# Patient Record
Sex: FEMALE | Race: OTHER | NOT HISPANIC OR LATINO | URBAN - METROPOLITAN AREA
[De-identification: names, ages, dates, MRNs, and addresses within clinical notes are randomized per-mention and may not be internally consistent; named-entity substitution may affect disease eponyms.]

---

## 2022-07-19 ENCOUNTER — OUTPATIENT (OUTPATIENT)
Dept: OUTPATIENT SERVICES | Facility: HOSPITAL | Age: 42
LOS: 1 days | End: 2022-07-19
Payer: COMMERCIAL

## 2022-07-19 DIAGNOSIS — R06.02 SHORTNESS OF BREATH: ICD-10-CM

## 2022-07-19 PROCEDURE — 94729 DIFFUSING CAPACITY: CPT

## 2022-07-19 PROCEDURE — 94010 BREATHING CAPACITY TEST: CPT | Mod: 26

## 2022-07-19 PROCEDURE — 94760 N-INVAS EAR/PLS OXIMETRY 1: CPT

## 2022-07-19 PROCEDURE — 94726 PLETHYSMOGRAPHY LUNG VOLUMES: CPT

## 2022-07-19 PROCEDURE — 94729 DIFFUSING CAPACITY: CPT | Mod: 26

## 2022-07-19 PROCEDURE — 94060 EVALUATION OF WHEEZING: CPT

## 2022-07-19 PROCEDURE — 94726 PLETHYSMOGRAPHY LUNG VOLUMES: CPT | Mod: 26

## 2022-07-21 ENCOUNTER — OUTPATIENT (OUTPATIENT)
Dept: OUTPATIENT SERVICES | Facility: HOSPITAL | Age: 42
LOS: 1 days | End: 2022-07-21
Payer: COMMERCIAL

## 2022-07-21 DIAGNOSIS — R06.02 SHORTNESS OF BREATH: ICD-10-CM

## 2022-07-21 PROCEDURE — 94070 EVALUATION OF WHEEZING: CPT | Mod: 26

## 2022-07-21 PROCEDURE — 94618 PULMONARY STRESS TESTING: CPT

## 2022-07-21 PROCEDURE — 94618 PULMONARY STRESS TESTING: CPT | Mod: 26,59

## 2022-07-21 PROCEDURE — 94070 EVALUATION OF WHEEZING: CPT

## 2022-07-21 RX ORDER — ALBUTEROL 90 UG/1
2.5 AEROSOL, METERED ORAL ONCE
Refills: 0 | Status: DISCONTINUED | OUTPATIENT
Start: 2022-07-21 | End: 2022-08-04

## 2022-08-19 ENCOUNTER — APPOINTMENT (OUTPATIENT)
Dept: PULMONOLOGY | Facility: CLINIC | Age: 42
End: 2022-08-19

## 2022-08-19 PROBLEM — Z00.00 ENCOUNTER FOR PREVENTIVE HEALTH EXAMINATION: Status: ACTIVE | Noted: 2022-08-19

## 2022-11-02 ENCOUNTER — NON-APPOINTMENT (OUTPATIENT)
Age: 42
End: 2022-11-02

## 2022-11-04 ENCOUNTER — NON-APPOINTMENT (OUTPATIENT)
Age: 42
End: 2022-11-04

## 2022-11-22 ENCOUNTER — LABORATORY RESULT (OUTPATIENT)
Age: 42
End: 2022-11-22

## 2022-11-22 ENCOUNTER — APPOINTMENT (OUTPATIENT)
Dept: INTERNAL MEDICINE | Facility: CLINIC | Age: 42
End: 2022-11-22
Payer: COMMERCIAL

## 2022-11-22 ENCOUNTER — NON-APPOINTMENT (OUTPATIENT)
Age: 42
End: 2022-11-22

## 2022-11-22 VITALS
SYSTOLIC BLOOD PRESSURE: 127 MMHG | BODY MASS INDEX: 34.57 KG/M2 | TEMPERATURE: 97.8 F | RESPIRATION RATE: 12 BRPM | HEART RATE: 66 BPM | OXYGEN SATURATION: 100 % | DIASTOLIC BLOOD PRESSURE: 84 MMHG | WEIGHT: 200 LBS | HEIGHT: 63.78 IN

## 2022-11-22 DIAGNOSIS — Z12.39 ENCOUNTER FOR OTHER SCREENING FOR MALIGNANT NEOPLASM OF BREAST: ICD-10-CM

## 2022-11-22 DIAGNOSIS — Z83.438 FAMILY HISTORY OF OTHER DISORDER OF LIPOPROTEIN METABOLISM AND OTHER LIPIDEMIA: ICD-10-CM

## 2022-11-22 DIAGNOSIS — Z82.49 FAMILY HISTORY OF ISCHEMIC HEART DISEASE AND OTHER DISEASES OF THE CIRCULATORY SYSTEM: ICD-10-CM

## 2022-11-22 PROCEDURE — 99386 PREV VISIT NEW AGE 40-64: CPT | Mod: 25

## 2022-11-22 PROCEDURE — 99202 OFFICE O/P NEW SF 15 MIN: CPT | Mod: GC,25

## 2022-11-22 PROCEDURE — 83036 HEMOGLOBIN GLYCOSYLATED A1C: CPT | Mod: QW

## 2022-11-22 RX ORDER — SEMAGLUTIDE 1.34 MG/ML
2 INJECTION, SOLUTION SUBCUTANEOUS
Qty: 1.5 | Refills: 0 | Status: ACTIVE | COMMUNITY
Start: 2022-11-22 | End: 1900-01-01

## 2022-11-22 NOTE — ASSESSMENT
[FreeTextEntry1] : 43yo F w/ a PMHx of obesity and COVID presenting to establish care. \par \par #Obesity\par Patient reports she was in discussion with her previous PCP about starting weight loss medications. She tried metformin but did not like the way it made her feel. She states she was going to start ozempic but had to switch PCPs due to insurance issues before she could start the medication. Patient reports she has made changes to her diet and increased her physical activity with minimal change in weight. \par - f/u A1c and CMP\par - start ozempic (0.25mg once weekly for weeks 1-4 and escalate accordingly)\par - patient to send labs from previous PCP from July 2022\par \par #HCM\par - f/u labs from previous PCP re A1c and lipid panel (patient reports labs were within the past 6 months)\par - reports most recent pap 2 years ago\par - s/p flu shot this year \par \par \par *RTC in 1 month to assess response to ozempic. If tolerating, can increase to 0.5mg once weekly for weeks 5-8 and escalate accordingly)

## 2022-11-22 NOTE — END OF VISIT
[] : Resident [FreeTextEntry3] : 42F presenting to establish care. 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 Is interested in starting ozempic for weight loss  which she had discussed with her prior PCP. No family or personal h/o thyroid tumors, no h/o pancreatitis. Tried phentermine in the past with good effect, but did not continue on it due to concern for possible CV side effects w/ prolonged use. Tried metformin which caused some stomach upset, used it only sporadically for a few months. Not currently sexually active. Well appearing on exam. After discussion of risks and benefits of GLP-1 agonists, prescribed ozempic. Discussed possible barriers of insurance coverage and recent shortage of this medication. Will check A1C and CMP today (TSH, lipids checked by prior PCP in July 2022). Pt will notify us if she has difficulty obtaining ozempic. RTC 3-4 weeks for f/u above or earlier prn.

## 2022-11-22 NOTE — HISTORY OF PRESENT ILLNESS
[FreeTextEntry1] : establish care [de-identified] : 41yo F w/ a PMHx of obesity and COVID presenting to establish care. Patient reports she feels well and has no acute complaints. She tested positive for COVID one month prior to visit and reports she feels back to baseline. Patient reports she was in discussion with her previous PCP about starting weight loss medications. She tried metformin but did not like the way it made her feel. She states she was going to start ozempic but had to switch PCPs due to insurance issues before she could start the medication. Patient reports she has made changes to her diet and increased her physical activity with minimal change in weight. Denies fevers, headaches, dizziness, lightheadedness, chest pain, sob, abdominal pain, n/v/d.

## 2022-12-15 ENCOUNTER — NON-APPOINTMENT (OUTPATIENT)
Age: 42
End: 2022-12-15

## 2022-12-22 ENCOUNTER — EMERGENCY (EMERGENCY)
Facility: HOSPITAL | Age: 42
LOS: 1 days | Discharge: HOME | End: 2022-12-22
Admitting: STUDENT IN AN ORGANIZED HEALTH CARE EDUCATION/TRAINING PROGRAM
Payer: COMMERCIAL

## 2022-12-22 VITALS
TEMPERATURE: 98 F | SYSTOLIC BLOOD PRESSURE: 140 MMHG | RESPIRATION RATE: 15 BRPM | WEIGHT: 199.96 LBS | OXYGEN SATURATION: 99 % | HEART RATE: 68 BPM | HEIGHT: 64 IN | DIASTOLIC BLOOD PRESSURE: 84 MMHG
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
## 2022-12-22 DIAGNOSIS — M25.562 PAIN IN LEFT KNEE: ICD-10-CM
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
## 2022-12-22 DIAGNOSIS — M54.6 PAIN IN THORACIC SPINE: ICD-10-CM

## 2022-12-22 PROCEDURE — 99285 EMERGENCY DEPT VISIT HI MDM: CPT

## 2022-12-22 NOTE — ED PROVIDER NOTE - OBJECTIVE STATEMENT
41 yo fem without significant pmhx has been having left sided posterior knee pain x 4-6 weeks.  Apx 1 week ago while carrying heavy work backpack her knee buckled and she fell, injuring her back.  She was seen at urgent care where plain films of her thoracic spine and left knee were negative.  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  She is concerned that continuing to walk/work will worsen her injuries, and she wants an MRI of her knee. Has not had an ultrasound.  Pain increased in past couple of days.  Pain in the midback worse when laying supine.  No weakness/numbness in legs, no saddle anesthesia, no fever/chills, no bladder/bowel dysfunction.  No swelling of leg, no calf tenderness, no cough, SOB, hemoptysis, chest pain.  No hx of VTE.

## 2022-12-22 NOTE — ED PROVIDER NOTE - NSFOLLOWUPINSTRUCTIONS_ED_ALL_ED_FT
Chronic Knee Pain, Adult      Chronic knee pain is pain in one or both knees that lasts longer than 3 months. Symptoms of chronic knee pain may include swelling, stiffness, and discomfort. Age-related wear and tear (osteoarthritis) of the knee joint is the most common cause of chronic knee pain. Other possible causes include:  •A long-term immune-related disease that causes inflammation of the knee (rheumatoid arthritis). This usually affects both knees.      •Inflammatory arthritis, such as gout or pseudogout.      •An injury to the knee that causes arthritis.      •An injury to the knee that damages the ligaments. Ligaments are strong tissues that connect bones to each other.      •Runner's knee or pain behind the kneecap.      Treatment for chronic knee pain depends on the cause. The main treatments for chronic knee pain are physical therapy and weight loss. This condition may also be treated with medicines, injections, a knee sleeve or brace, and by using crutches. Rest, ice, pressure (compression), and elevation, also known as RICE therapy, may also be recommended.      Follow these instructions at home:      If you have a knee sleeve or brace:      •Wear the knee sleeve or brace as told by your health care provider. Remove it only as told by your health care provider.      •Loosen it if your toes tingle, become numb, or turn cold and blue.      •Keep it clean.    •If the sleeve or brace is not waterproof:  •Do not let it get wet.       •Remove it if allowed by your health care provider, or cover it with a watertight covering when you take a bath or a shower.          Managing pain, stiffness, and swelling                 •If directed, apply heat to the affected area as often as told by your health care provider. Use the heat source that your health care provider recommends, such as a moist heat pack or a heating pad.  •If you have a removable knee sleeve or brace, remove it as told by your health care provider.      •Place a towel between your skin and the heat source.      •Leave the heat on for 20–30 minutes.      •Remove the heat if your skin turns bright red. This is especially important if you are unable to feel pain, heat, or cold. You may have a greater risk of getting burned.      •If directed, put ice on the affected area. To do this:  •If you have a removable knee sleeve or brace, remove it as told by your health care provider.      •Put ice in a plastic bag.      •Place a towel between your skin and the bag.      •Leave the ice on for 20 minutes, 2–3 times a day.      •Remove the ice if your skin turns bright red. This is very important. If you cannot feel pain, heat, or cold, you have a greater risk of damage to the area.        •Move your toes often to reduce stiffness and swelling.      •Raise (elevate) the injured area above the level of your heart while you are sitting or lying down.      Activity     •Avoid high-impact activities or exercises, such as running, jumping rope, or doing jumping jacks.    •Follow the exercise plan that your health care provider designed for you. Your health care provider may suggest that you:  •Avoid activities that make knee pain worse. This may require you to change your exercise routines, sport participation, or job duties.      •Wear shoes with cushioned soles.      •Avoid sports that require running and sudden changes in direction.      •Do physical therapy. Physical therapy is planned to match your needs and abilities. It may include exercises for strength, flexibility, stability, and endurance.      •Do exercises that increase balance and strength, such as velasquez chi and yoga.        • Do not use the injured limb to support your body weight until your health care provider says that you can. Use crutches as told by your health care provider.      •Return to your normal activities as told by your health care provider. Ask your health care provider what activities are safe for you.      General instructions     •Take over-the-counter and prescription medicines only as told by your health care provider.      •Lose weight if you are overweight. Losing even a little weight can reduce knee pain. Ask your health care provider what your ideal weight is, and how to safely lose extra weight. A dietitian may be able to help you plan your meals.      • Do not use any products that contain nicotine or tobacco, such as cigarettes, e-cigarettes, and chewing tobacco. These can delay healing. If you need help quitting, ask your health care provider.      •Keep all follow-up visits. This is important.        Contact a health care provider if:    •You have knee pain that is not getting better or gets worse.      •You are unable to do your physical therapy exercises due to knee pain.        Get help right away if:    •Your knee swells and the swelling becomes worse.      •You cannot move your knee.      •You have severe knee pain.        Summary    •Knee pain that lasts more than 3 months is considered chronic knee pain.      •The main treatments for chronic knee pain are physical therapy and weight loss. You may also need to take medicines, wear a knee sleeve or brace, use crutches, and apply ice or heat.      •Losing even a little weight can reduce knee pain. Ask your health care provider what your ideal weight is, and how to safely lose extra weight. A dietitian may be able to help you plan your meals.      •Follow the exercise plan that your health care provider designed for you.      This information is not intended to replace advice given to you by your health care provider. Make sure you discuss any questions you have with your health care provider.          Musculoskeletal Pain      Musculoskeletal pain refers to aches and pains in your bones, joints, muscles, and the tissues that surround them. This pain can occur in any part of the body. It can last for a short time (acute) or a long time (chronic).    A physical exam, lab tests, and imaging studies may be done to find the cause of your musculoskeletal pain.      Follow these instructions at home:    Lifestyle   •Try to control or lower your stress levels. Stress increases muscle tension and can worsen musculoskeletal pain. It is important to recognize when you are anxious or stressed and learn ways to manage it. This may include:  •Meditation or yoga.      •Cognitive or behavioral therapy.      •Acupuncture or massage therapy.        •You may continue all activities unless the activities cause more pain. When the pain gets better, slowly resume your normal activities. Gradually increase the intensity and duration of your activities or exercise.        Managing pain, stiffness, and swelling                   •Treatment may include medicines for pain and inflammation that are taken by mouth or applied to the skin. Take over-the-counter and prescription medicines only as told by your health care provider.      •When your pain is severe, bed rest may be helpful. Lie or sit in any position that is comfortable, but get out of bed and walk around at least every couple of hours.    •If directed, apply heat to the affected area as often as told by your health care provider. Use the heat source that your health care provider recommends, such as a moist heat pack or a heating pad.  •Place a towel between your skin and the heat source.      •Leave the heat on for 20–30 minutes.      •Remove the heat if your skin turns bright red. This is especially important if you are unable to feel pain, heat, or cold. You may have a greater risk of getting burned.      •If directed, put ice on the painful area. To do this:  •Put ice in a plastic bag.      •Place a towel between your skin and the bag.      •Leave the ice on for 20 minutes, 2–3 times a day.      •Remove the ice if your skin turns bright red. This is very important. If you cannot feel pain, heat, or cold, you have a greater risk of damage to the area.        General instructions     •Your health care provider may recommend that you see a physical therapist. This person can help you come up with a safe exercise program.      •If told by your health care provider, do physical therapy exercises to improve movement and strength in the affected area.      •Keep all follow-up visits. This is important. This includes any physical therapy visits.        Contact a health care provider if:    •Your pain gets worse.      •Medicines do not help ease your pain.      •You cannot use the part of your body that hurts, such as your arm, leg, or neck.      •You have trouble sleeping.      •You have trouble doing your normal activities.        Get help right away if:    •You have a new injury and your pain is worse or different.      •You feel numb or you have tingling in the painful area.        Summary    •Musculoskeletal pain refers to aches and pains in your bones, joints, muscles, and the tissues that surround them.      •This pain can occur in any part of the body.      •Your health care provider may recommend that you see a physical therapist. This person can help you come up with a safe exercise program. Do any exercises as told by your physical therapist.      •Lower your stress level. Stress can worsen musculoskeletal pain. Ways to lower stress may include meditation, yoga, cognitive or behavioral therapy, acupuncture, and massage therapy.      This information is not intended to replace advice given to you by your health care provider. Make sure you discuss any questions you have with your health care provider.

## 2022-12-22 NOTE — ED PROVIDER NOTE - CLINICAL SUMMARY MEDICAL DECISION MAKING FREE TEXT BOX
Subacute pain left popliteal region and knee buckled 1 week ago, causing a fall.  She did not hit her head and has no symptoms to suggest head injury.  Has back pain from the injury, no neuro deficits.  Plain films from urgent care were neg but she still has a lot of pain.  She is requesting advanced imaging of spine; will get CT scan without contrast.  Left leg tender in popliteal region, will get US to r/o DVT. Has no s/s to suggest PE. Plain films of her knee last week were negative.  She wants an MRI to eval for ligamentous/meniscal injury, but not indicated emergently.

## 2022-12-22 NOTE — ED PROVIDER NOTE - PHYSICAL EXAMINATION
CONSTITUTIONAL: NAD   SKIN: Normal color and turgor.    HEAD: NC/AT.  EYES: Conjunctiva clear. EOMI. PERRL.    ENT: Airway clear. Normal voice.   RESPIRATORY:  Normal work of breathing. Lungs CTAB.  CARDIOVASCULAR:  RRR, S1S2. No M/R/G.      GI:  Abdomen soft, nontender.    MSK: Neck supple.  No midline neck tenderness.  Mild tenderness over thoracolumbar spine; no swelling or deformity.  Left popliteal tenderness, no swelling of knee or popliteal fossa. Normal ROM of knee. No LE edema or calf tenderness. DP/PT pulses intact.  NEURO: Alert; CN: no facial asymmetry. Speech clear.  CINTRON. Gait steady. 5/5 strength hip flex, knee ext, foot plantarflexion and dorsiflexion, great toe extension.  SILT throughout.

## 2022-12-22 NOTE — ED ADULT TRIAGE NOTE - CHIEF COMPLAINT QUOTE
knee pain x 4-6 weeks and back pain. had mechanical fall thursday and had xrays done at urgent care. as per pt there was no abnormalities but wants an MRI

## 2022-12-22 NOTE — ED PROVIDER NOTE - PATIENT PORTAL LINK FT
You can access the FollowMyHealth Patient Portal offered by [facility] by registering at the following website: [email or web address]. By joining [facility]’s FollowMyHealth portal, you will also be able to view your health information using other applications (apps) compatible with our system.

## 2022-12-23 PROCEDURE — 72131 CT LUMBAR SPINE W/O DYE: CPT | Mod: MG

## 2022-12-23 PROCEDURE — G1004: CPT

## 2022-12-23 PROCEDURE — 99284 EMERGENCY DEPT VISIT MOD MDM: CPT | Mod: 25

## 2022-12-23 PROCEDURE — 72128 CT CHEST SPINE W/O DYE: CPT | Mod: 26,MG

## 2022-12-23 PROCEDURE — 93971 EXTREMITY STUDY: CPT | Mod: 26,LT

## 2022-12-23 PROCEDURE — 72128 CT CHEST SPINE W/O DYE: CPT | Mod: MG

## 2022-12-23 PROCEDURE — 72131 CT LUMBAR SPINE W/O DYE: CPT | Mod: 26,MG

## 2022-12-23 PROCEDURE — 93971 EXTREMITY STUDY: CPT

## 2022-12-23 NOTE — ED ADULT NURSE NOTE - OBJECTIVE STATEMENT
Pt presents to ED C/O knee pain x 6 weeks with lower back pain worsening S/P mechanical trip and fall Thurs. reports recent x-rays at . Pt ambulating independently. Seen and eval by PA, awaiting imaging. Pt presents to ED C/O L posterior knee pain x 6 weeks S/P COVID dx and starting new job, also C/O upper back pain worsening S/P mechanical trip and fall on back on Thurs. Pt reports recent x-rays at  that were negative of knee and back. States, " MD Armstrong recommended MRI but that could take 6 weeks" Pt Ambulating independently. Denies CP SOB. Denies bowel/ bladder incontinence. Seen and eval by PA, awaiting imaging.

## 2022-12-23 NOTE — ED ADULT NURSE NOTE - NSIMPLEMENTINTERV_GEN_ALL_ED
Implemented All Fall Risk Interventions:  [place] to call system. Call bell, personal items and telephone within reach. Instruct patient to call for assistance. Room bathroom lighting operational. Non-slip footwear when patient is off stretcher. Physically safe environment: no spills, clutter or unnecessary equipment. Stretcher in lowest position, wheels locked, appropriate side rails in place. Provide visual cue, wrist band, yellow gown, etc. Monitor gait and stability. Monitor for mental status changes and reorient to person, place, and time. Review medications for side effects contributing to fall risk. Reinforce activity limits and safety measures with patient and family.

## 2023-01-23 ENCOUNTER — NON-APPOINTMENT (OUTPATIENT)
Age: 43
End: 2023-01-23

## 2023-01-24 ENCOUNTER — APPOINTMENT (OUTPATIENT)
Dept: INTERNAL MEDICINE | Facility: CLINIC | Age: 43
End: 2023-01-24
Payer: COMMERCIAL

## 2023-01-24 DIAGNOSIS — E66.9 OBESITY, UNSPECIFIED: ICD-10-CM

## 2023-01-24 DIAGNOSIS — Z78.9 OTHER SPECIFIED HEALTH STATUS: ICD-10-CM

## 2023-01-24 DIAGNOSIS — Z72.89 OTHER PROBLEMS RELATED TO LIFESTYLE: ICD-10-CM

## 2023-01-24 PROCEDURE — 99443: CPT

## 2023-01-24 RX ORDER — METFORMIN HYDROCHLORIDE 500 MG/1
500 TABLET, COATED ORAL DAILY
Qty: 30 | Refills: 1 | Status: ACTIVE | COMMUNITY
Start: 2023-01-24 | End: 1900-01-01

## 2023-01-24 NOTE — HISTORY OF PRESENT ILLNESS
[FreeTextEntry1] : Wants to lose weight [de-identified] : This visit was provided via telehealth using real-time 2-way audio visual technology. The patient, CYNTHIA BROOKS, was located at home, [address] [place] [address] [address], [place] [address], at the time of the visit. \par The provider, NATANAEL BISHOP, was located at the medical office located in [facility] [address] [place] [address], 2nd floor, [place] [address] at the time of the visit. The patient, CYNTHIA BROOKS and Provider participated in the telehealth encounter. \par \par Verbal consent for telehealth services was given on January 24, 2022 by the patient, CYNTHIA BROOKS.\par \par Ms. Brooks is a 42 yo F w/ PMHx of obesity (BMI 34.5) and COVID who scheduled a TTM visit to discuss oral weight loss supplements. She stated that she was seen in Nov and discussed about starting ozempic but required prior authorization to proceed, A1C was lost, and she has been taking old metformin prescription in the meantime to help with the weight loss; she has also signed up for a gym membership. She hasn't had any side effects from taking the metformin such as HA, diaphoresis, abd pain, n/v/d. \par \par CYNTHIA BROOKS,an established pt at this office, reached out to this provider for weight loss medication. No recent visit within the last 7 days. A visit is not scheduled within the next 7 days at this time. \par \par Discussed with patient: You have chosen to receive care through the use of tele-media. Tele-media enables health care providers at different locations to provide safe, effective, and convenient care through the use of technology. Please note this is a billable encounter. As with any health care service, there are risks associated with the use of tele-media, including equipment failure, poor image and/or resolution, and  issues. You understand that I cannot physically examine you and that you may need to come to the clinic to complete the assessment. Patient agreed verbally to understanding the risks and benefits of tele-media as explained. All questions regarding tele-media encounters were answered. \par \par Total time spent with patient on the phone is 25 minutes.\par \par \par

## 2023-01-24 NOTE — END OF VISIT
[FreeTextEntry3] : I was present with the Resident during the key portions of this encounter and provided supervision via audio/visual technology.  I agree with the findings and plan as documented in the Resident's note, unless noted below. 43F presenting via phone for f/u of her weight. Ozempic prescribed previously for weight loss was not covered by insurance. Tried metformin without much effect, though didn't take it consistently. Pt recently joined [employer].  Is in the process of getting new health insurance and is hopeful for better coverage of weight loss medications, but in the meantime wished to discuss other oral medication options for weight management. Discussed bupropion, topiramate, pt prefers to resume metformin for now.  RTC 3 months for f/u of weight or earlier prn.

## 2023-01-24 NOTE — ASSESSMENT
[FreeTextEntry1] : 42 yo F w/ a PMHx of obesity and COVID called to discuss weight loss management.\par \par #Obesity\par BMI 34.5 upon last visit in Nov, hasn't noticed any weight loss changes. Recently, started on metformin. Unknown A1C, glucose ~106 with CMP in Nov 2022. Hasn't taken ozempic. \par \par Plan:\par - Rx 500 mg Qd metformin for 30 days\par - Pt aware of possible side effects \par - RTC in 2 months to see if ozempic could be authorized
